# Patient Record
Sex: MALE | URBAN - METROPOLITAN AREA
[De-identification: names, ages, dates, MRNs, and addresses within clinical notes are randomized per-mention and may not be internally consistent; named-entity substitution may affect disease eponyms.]

---

## 2022-10-22 ENCOUNTER — EMERGENCY (EMERGENCY)
Facility: HOSPITAL | Age: 32
LOS: 1 days | Discharge: HOME | End: 2022-10-22
Attending: EMERGENCY MEDICINE
Payer: MEDICAID

## 2022-10-22 VITALS
DIASTOLIC BLOOD PRESSURE: 83 MMHG | HEART RATE: 98 BPM | RESPIRATION RATE: 16 BRPM | OXYGEN SATURATION: 98 % | TEMPERATURE: 98 F | SYSTOLIC BLOOD PRESSURE: 140 MMHG | WEIGHT: 203.93 LBS

## 2022-10-22 PROCEDURE — 70360 X-RAY EXAM OF NECK: CPT

## 2022-10-22 PROCEDURE — 99284 EMERGENCY DEPT VISIT MOD MDM: CPT

## 2022-10-22 PROCEDURE — 99283 EMERGENCY DEPT VISIT LOW MDM: CPT | Mod: 25

## 2022-10-22 PROCEDURE — 70360 X-RAY EXAM OF NECK: CPT | Mod: 26

## 2022-10-22 RX ORDER — DEXAMETHASONE 0.5 MG/5ML
6 ELIXIR ORAL ONCE
Refills: 0 | Status: COMPLETED | OUTPATIENT
Start: 2022-10-22 | End: 2022-10-22

## 2022-10-22 RX ADMIN — Medication 6 MILLIGRAM(S): at 21:05

## 2022-10-22 NOTE — ED ADULT TRIAGE NOTE - CHIEF COMPLAINT QUOTE
I swallowed a big piece of carrot 2 days ago I saw MD yesterday and today I feel like having pain when I swallow

## 2022-10-22 NOTE — ED PROVIDER NOTE - OBJECTIVE STATEMENT
32 year old male presents to the ED with complaints of right neck pain and irritation. Patient states 2 days ago, he choked on a carrot and felt it stuck but was able to clear it. States he then went to urgent care that told him to wait it out but if the pain becomes worse, to come to the ER. Today, patient starting having the presenting symptoms. Denies drooling or vomiting. Reports he hasn't eaten solids.

## 2022-10-22 NOTE — ED ADULT NURSE NOTE - OBJECTIVE STATEMENT
States he was eating carrots 2 days ago,was seen by MD  yesterday .Today c/o pain when swallowing . States he was eating carrots 2 days ago and felt throat got cogged with difficulty breathing .States mother did manouver and he felt better ,did not see foreign body come out . mother  was seen by MD  yesterday .Today c/o pain when swallowing ..Airway patent ,no visible foreign body noted .9:25pm .Able to swallow bread and juice without difficulty . States he was eating carrots 2 days ago and felt throat got cogged was choking  with difficulty breathing .States mother did heimlich  manouver and he felt better ,tbroat cleared  ,did not see foreign body come out .  Was seen by [facility] MD  yesterday .Today c/o pain when swallowing ..Airway patent ,no visible foreign body noted .9:25pm .Able to swallow bread and juice without difficulty .

## 2022-10-22 NOTE — ED PROVIDER NOTE - PATIENT PORTAL LINK FT
You can access the FollowMyHealth Patient Portal offered by [facility] by registering at the following website: [email or web address]. By joining [employer] ( [employer] )’s FollowMyHealth portal, you will also be able to view your health information using other applications (apps) compatible with our system.

## 2022-10-22 NOTE — ED ADULT NURSE NOTE - NSIMPLEMENTINTERV_GEN_ALL_ED
Implemented All Universal Safety Interventions:  [employer] to call system. Call bell, personal items and telephone within reach. Instruct patient to call for assistance. Room bathroom lighting operational. Non-slip footwear when patient is off stretcher. Physically safe environment: no spills, clutter or unnecessary equipment. Stretcher in lowest position, wheels locked, appropriate side rails in place.

## 2022-11-17 ENCOUNTER — EMERGENCY (EMERGENCY)
Facility: HOSPITAL | Age: 32
LOS: 1 days | Discharge: HOME | End: 2022-11-17
Attending: STUDENT IN AN ORGANIZED HEALTH CARE EDUCATION/TRAINING PROGRAM
Payer: MEDICAID

## 2022-11-17 PROCEDURE — 99283 EMERGENCY DEPT VISIT LOW MDM: CPT

## 2022-11-18 VITALS
RESPIRATION RATE: 18 BRPM | OXYGEN SATURATION: 97 % | SYSTOLIC BLOOD PRESSURE: 143 MMHG | WEIGHT: 203.93 LBS | TEMPERATURE: 99 F | DIASTOLIC BLOOD PRESSURE: 89 MMHG | HEART RATE: 102 BPM

## 2022-11-18 VITALS
SYSTOLIC BLOOD PRESSURE: 142 MMHG | RESPIRATION RATE: 17 BRPM | DIASTOLIC BLOOD PRESSURE: 85 MMHG | HEART RATE: 101 BPM | OXYGEN SATURATION: 98 % | TEMPERATURE: 98 F

## 2022-11-18 PROCEDURE — 99282 EMERGENCY DEPT VISIT SF MDM: CPT

## 2022-11-18 RX ADMIN — Medication 30 MILLILITER(S): at 02:43

## 2022-11-18 NOTE — ED PROVIDER NOTE - NSFOLLOWUPCLINICS_GEN_ALL_ED_FT
[facility]  Gastroenterology  [address] [place] [address]  Phone: [phone]  Fax: [phone]  Follow Up Time: 1-3 Days

## 2022-11-18 NOTE — ED ADULT TRIAGE NOTE - ACCOMPANIED BY
[facility]  [address],  is APPROVED. Valid from 03/19/2020 to 03/19/2021. Authorization # [id number]. [facility], [address] [facility]. [id number] entered for scheduling. Immediate family member

## 2022-11-18 NOTE — ED PROVIDER NOTE - CLINICAL SUMMARY MEDICAL DECISION MAKING FREE TEXT BOX
Pt p/w throat irritation after eating pasta with tomato sauce only. Tolerating PO in the ED. Completely benign exam. Rec GI f/u ASAP. Most likely GERD vs other non emergent etiology of symptoms- the details of the case, history, and exam make more emergent diagnoses much less likely. Discussed with pt my clinical impression and results, patient given strict return precautions if persistent or worsening of symptoms occurs, and need for close follow up. Pt expressed understanding and agrees with plan. Pt is well appearing with a reassuring exam. Discharge home with PMD or Specialist f/u within 5 days.

## 2022-11-18 NOTE — ED PROVIDER NOTE - PATIENT PORTAL LINK FT
You can access the FollowMyHealth Patient Portal offered by [facility] by registering at the following website: [email or web address]. By joining [employer]’s FollowMyHealth portal, you will also be able to view your health information using other applications (apps) compatible with our system.

## 2022-11-18 NOTE — ED PROVIDER NOTE - OBJECTIVE STATEMENT
31 y/o male with no significant PMHx, p/w several hours of feeling mild irritation in the throat after eating pasta with concern that food is stuck in the esophagus. Patient seen approximately 1 month ago for same complaint and stated it resolved spontaneously at last episode. Patient states he is tolerating PO in the ER and denies nausea, vomiting, chest pain, shortness of breath, syncope, fever, or any other recent illnesses and hospitalizations.

## 2022-11-18 NOTE — ED PROVIDER NOTE - NSFOLLOWUPINSTRUCTIONS_ED_ALL_ED_FT
You were seen in the emergency room today for throat irritation with food. Please see the Gastrointestinal doctors at the next available appointment. Please call your primary doctor to inform them of this ER visit and obtain the next available appointment within the next 5 days. As we discussed, return to the ER if you have any worsening symptoms.    We no longer feel that you need further emergency care or admission to the hospital at this time.    While we have determined that you are currently stable for discharge, we know that things can change. Please seek immediate medical attention or return to the ER if you experience any of the following:  Any worsening or persistent symptoms  Severe Pain  Chest Pain  Difficulty Breathing  Bleeding  Passing Out  Severe Rash  Inability to Eat or Drink  Persistent Fever    Please see a primary care doctor or specialist within 5 days to ensure that you are improving.    Please call the [employer] phone numbers on this document if you have any problems obtaining a follow up appointment.    I wish you well! -Dr Hill

## 2022-11-18 NOTE — ED ADULT TRIAGE NOTE - CCCP TRG CHIEF CMPLNT
'' I'M EATING PASTA AN HOUR AGO AND I THINK SOMETHING GOT STUCK IN MY THROAT'' AS PER PT STATED/foreign body throat

## 2022-11-18 NOTE — ED ADULT NURSE NOTE - OBJECTIVE STATEMENT
Pt presents to the ER ambulatory from home. Pt states that he was eating lasagna and it go stuck in his throat. Pt able to speak in clear and complete sentences.  Pt stated he had a similar issue several months ago. GCS 15, airway intact

## 2023-01-11 ENCOUNTER — APPOINTMENT (OUTPATIENT)
Dept: GASTROENTEROLOGY | Facility: CLINIC | Age: 33
End: 2023-01-11

## 2023-09-26 NOTE — ED ADULT NURSE NOTE - NSFALLRSKASSESSTYPE_ED_ALL_ED
KD:  I spoke with patient,she spoke with someone earlier (I assume endo follow up since no notes) she states she continues with the same symptoms as when first seen, tingling noted with gastro issues and mid chest pain. She cannot take the omeprazole or carafate so her primary care has her on licorice root and a smoothie in the morning which was helping with symptoms but since procedure notices symptoms have increased. I did advise she exercise to help alleviate gas. She will taking her dog for a walk soon. Patient will call in if needed. Initial (On Arrival)

## 2025-05-20 NOTE — ED PROVIDER NOTE - ENMT, MLM
Airway patent, Nasal mucosa clear. Mouth with normal mucosa. Throat has no vesicles, no oropharyngeal exudates and uvula is midline.
4 = No assist / stand by assistance

## 2025-06-23 NOTE — ED ADULT TRIAGE NOTE - INTERNATIONAL TRAVEL
BP READINGS:    06/10 @ 0645 - 149/86  06/11 @ 1330 - 123/82  06/12 @ 0705 - 175/86 @0715 - 164/86  06/13 @ 1300 - 143/77  06/14 @ 1400 - 140/80  06/15 @ 0645 - 145/86 @1350 - 131/71  06/16 @ 1100 - 174/88  06/17 @ 1345 - 123/67  06/18 @ 1100 - 181/83 @1300 - 144/66  06/19 @ 1300 - 107/71  06/20 @ 1320 - 111/71  06/21 @ 1000 - 128/74  06/22 @ 1300 - 128/71    PATIENT DENIES ANY SYMPTOMS.    No
